# Patient Record
Sex: FEMALE | Race: AMERICAN INDIAN OR ALASKA NATIVE
[De-identification: names, ages, dates, MRNs, and addresses within clinical notes are randomized per-mention and may not be internally consistent; named-entity substitution may affect disease eponyms.]

---

## 2017-12-20 ENCOUNTER — HOSPITAL ENCOUNTER (EMERGENCY)
Dept: HOSPITAL 5 - ED | Age: 32
Discharge: HOME | End: 2017-12-20
Payer: MEDICAID

## 2017-12-20 VITALS — SYSTOLIC BLOOD PRESSURE: 124 MMHG | DIASTOLIC BLOOD PRESSURE: 85 MMHG

## 2017-12-20 DIAGNOSIS — J45.21: Primary | ICD-10-CM

## 2017-12-20 PROCEDURE — 94640 AIRWAY INHALATION TREATMENT: CPT

## 2017-12-20 PROCEDURE — 93005 ELECTROCARDIOGRAM TRACING: CPT

## 2017-12-20 PROCEDURE — 99283 EMERGENCY DEPT VISIT LOW MDM: CPT

## 2017-12-20 PROCEDURE — 96372 THER/PROPH/DIAG INJ SC/IM: CPT

## 2017-12-20 PROCEDURE — 93010 ELECTROCARDIOGRAM REPORT: CPT

## 2017-12-20 NOTE — EMERGENCY DEPARTMENT REPORT
HPI





- General


Chief Complaint: Adult Asthma


Time Seen by Provider: 17 07:47





- HPI


HPI: 





She is a 32-year-old female with a history of asthma who presents to ED with an 

asthma exacerbation that started early this morning.  Patient states she was 

sleeping when the asthma attack occured.  Patient States She Been Coughing to 

This Prior Intermittently, Dry, Nonproductive Cough.


Patient denies fevers/chills/nausea/vomiting/abdominal pain since chest pain.





ED Past Medical Hx





- Past Medical History


Previous Medical History?: Yes


Hx Asthma: Yes





- Surgical History


Past Surgical History?: Yes


Additional Surgical History:  x1





- Social History


Smoking Status: Never Smoker


Substance Use Type: None





- Medications


Home Medications: 


 Home Medications











 Medication  Instructions  Recorded  Confirmed  Last Taken  Type


 


Acetamin/Codeine 120-12Mg/5 ml 5 ml PO TID PRN #60 ml 17  Unknown Rx





[Tylenol/Codeine]     


 


Fluticasone/Salmeterol [Advair 2 puff PO PRN #1 blst.w.dev 17  Unknown Rx





250-50 Diskus]     


 


predniSONE [Deltasone] 20 mg PO QDAY #5 tab 17  Unknown Rx














ED Review of Systems


ROS: 


Stated complaint: SOB


Other details as noted in HPI





Constitutional: denies: chills, fever


Eyes: denies: eye pain, eye discharge, vision change


ENT: denies: ear pain, throat pain


Respiratory: denies: cough, shortness of breath, wheezing


Cardiovascular: denies: chest pain, palpitations


Endocrine: no symptoms reported


Gastrointestinal: denies: abdominal pain, nausea, diarrhea


Genitourinary: denies: urgency, dysuria, hematuria, discharge


Musculoskeletal: denies: back pain, joint swelling, arthralgia


Skin: denies: rash, lesions


Neurological: denies: headache, weakness, paresthesias


Psychiatric: denies: anxiety, depression


Hematological/Lymphatic: denies: easy bleeding, easy bruising





Physical Exam





- Physical Exam


Vital Signs: 


 Vital Signs











  17





  02:47 02:51 03:12


 


Temperature  98.8 F 98.8 F


 


Pulse Rate 122 H 122 H 122 H


 


Pulse Rate [   





Bilateral]   


 


Respiratory  24 24





Rate   


 


Respiratory   





Rate [Bilateral   





]   


 


Blood Pressure 110/78  110/78


 


Blood Pressure  110/78 





[Right]   


 


O2 Sat by Pulse 86 86 86





Oximetry   














  17





  03:29 07:46 07:51


 


Temperature   


 


Pulse Rate   92 H


 


Pulse Rate [ 125 H  





Bilateral]   


 


Respiratory  20 20





Rate   


 


Respiratory 20  





Rate [Bilateral   





]   


 


Blood Pressure   


 


Blood Pressure   124/85





[Right]   


 


O2 Sat by Pulse   95





Oximetry   











Physical Exam: 





GENERAL: Alert and oriented x3, no apparent distress, Normal Gait, atraumatic.


HEAD: Head is normocephalic and a-traumatic.


EYES: Extra ocular muscles are intact. Pupils are equal, round, and reactive to 

light and accommodation.


EARS: symetrical, atraumatic, non tender, ear canal clear and moderate cerumen, 

tympanic membrance non inflamed. gross auditory nml bilaterally. 


NOSE: Nose symetrical, Nontender,Nares appeared normal.


MOUTH:Mouth is well hydrated and without lesions. Tonsils nonerythematous or 

swollen,  Uvula midline, Tongue not elevated. Mucous membranes are moist. 

Posterior pharynx clear, no exudate or lesions. Patent airways.





LUNGS: Symetrical with respiration, No wheezing, no rales or crackles, CTAB.


HEART:  S1, S2 present, regular rate and rhythm without murmur, no rubs, no 

gallops. Non tender to palpation


ABDOMEN: No organomegaly was noted,Positive bowel sounds, soft, and non-

distended. Nontender to palpation on all Quadrants, NO CVA tenderness.


BACK: Full range of motion, no spinal tenderness, nontender to palpation.





EXTREMITIES/MUSCULOSKELETAL: No cyanosis, clubbing, rash, lesions or edema. 

Full ROM bilaterally. UE/LE Pulses 2+ bilaterally.  LE and UE 5+ strength 

bilaterally,


NEUROLOGIC:  The patient is cooperative with no focal neurologic deficits.   

Normal sensation in bilateral upper and lower extremities,  No loss of sensation

, .





SKIN:  Warm and dry, No lesions, No ulceration or induration present.











ED Course


 Vital Signs











  17





  02:47 02:51 03:12


 


Temperature  98.8 F 98.8 F


 


Pulse Rate 122 H 122 H 122 H


 


Pulse Rate [   





Bilateral]   


 


Respiratory  24 24





Rate   


 


Respiratory   





Rate [Bilateral   





]   


 


Blood Pressure 110/78  110/78


 


Blood Pressure  110/78 





[Right]   


 


O2 Sat by Pulse 86 86 86





Oximetry   














  17





  03:29 07:46 07:51


 


Temperature   


 


Pulse Rate   92 H


 


Pulse Rate [ 125 H  





Bilateral]   


 


Respiratory  20 20





Rate   


 


Respiratory 20  





Rate [Bilateral   





]   


 


Blood Pressure   


 


Blood Pressure   124/85





[Right]   


 


O2 Sat by Pulse   95





Oximetry   














ED Medical Decision Making





- Medical Decision Making


32-year-old female presents with asthma exacerbation


ED course: She received 2 breathing treatments with Solu-Medrol IM. 


Patient feeling much after my assessment.


She reports better.  Patient is in no respiratory acute distress


I discussed the patient if worsened symptoms to return to ED.


Patient states she has a nebulizer albuterol treatment and machine at home that 

can be used as needed.


I discussed the patient to follow up with primary care physician.


Patient will be sent home on Advair inhaler, prednisone and Tylenol with 

codeine.


Patient is satting 95% on room air oxygen much better than when she came in the 

ED.


Critical care attestation.: 


If time is entered above; I have spent that time in minutes in the direct care 

of this critically ill patient, excluding procedure time.








ED Disposition


Clinical Impression: 


Asthma exacerbation


Qualifiers:


 Asthma severity: mild Asthma persistence: intermittent Qualified Code(s): 

J45.21 - Mild intermittent asthma with (acute) exacerbation





Disposition: DC-01 TO HOME OR SELFCARE


Is pt being admited?: No


Does the pt Need Aspirin: No


Condition: Stable


Instructions:  Asthma (ED)


Additional Instructions: 


Make sure to follow up with the primary care physician as discussed.


Take all your medications as you've been prescribed.


If you have any worsening symptoms or develop new symptoms please return to ED 

immediately.


Prescriptions: 


Acetamin/Codeine 120-12Mg/5 ml [Tylenol/Codeine] 5 ml PO TID PRN #60 ml


 PRN Reason: Pain


Fluticasone/Salmeterol [Advair 250-50 Diskus] 2 puff PO PRN #1 blst.w.dev


predniSONE [Deltasone] 20 mg PO QDAY #5 tab


Referrals: 


[facility] [Outside] - 3-5 Days


[facility] [Outside] - 3-5 Days


[facility] [place] [Outside] - 3-5 Days


Forms:  Accompanied Note, Work/School Release Form(ED)


Time of Disposition: 08:09

## 2018-02-14 ENCOUNTER — HOSPITAL ENCOUNTER (EMERGENCY)
Dept: HOSPITAL 5 - ED | Age: 33
Discharge: HOME | End: 2018-02-14
Payer: MEDICAID

## 2018-02-14 VITALS — DIASTOLIC BLOOD PRESSURE: 89 MMHG | SYSTOLIC BLOOD PRESSURE: 126 MMHG

## 2018-02-14 DIAGNOSIS — J45.909: ICD-10-CM

## 2018-02-14 DIAGNOSIS — J06.9: Primary | ICD-10-CM

## 2018-02-14 LAB
BASOPHILS # (AUTO): 0 K/MM3 (ref 0–0.1)
BASOPHILS NFR BLD AUTO: 0.4 % (ref 0–1.8)
BUN SERPL-MCNC: 8 MG/DL (ref 7–17)
BUN/CREAT SERPL: 16 %
CALCIUM SERPL-MCNC: 8.7 MG/DL (ref 8.4–10.2)
EOSINOPHIL # BLD AUTO: 0.2 K/MM3 (ref 0–0.4)
EOSINOPHIL NFR BLD AUTO: 5.6 % (ref 0–4.3)
HCT VFR BLD CALC: 34.5 % (ref 30.3–42.9)
HEMOLYSIS INDEX: 3
HGB BLD-MCNC: 10.8 GM/DL (ref 10.1–14.3)
LYMPHOCYTES # BLD AUTO: 1.1 K/MM3 (ref 1.2–5.4)
LYMPHOCYTES NFR BLD AUTO: 28.7 % (ref 13.4–35)
MCH RBC QN AUTO: 24 PG (ref 28–32)
MCHC RBC AUTO-ENTMCNC: 31 % (ref 30–34)
MCV RBC AUTO: 77 FL (ref 79–97)
MONOCYTES # (AUTO): 0.3 K/MM3 (ref 0–0.8)
MONOCYTES % (AUTO): 6.7 % (ref 0–7.3)
PLATELET # BLD: 318 K/MM3 (ref 140–440)
RBC # BLD AUTO: 4.51 M/MM3 (ref 3.65–5.03)

## 2018-02-14 PROCEDURE — 36415 COLL VENOUS BLD VENIPUNCTURE: CPT

## 2018-02-14 PROCEDURE — 85025 COMPLETE CBC W/AUTO DIFF WBC: CPT

## 2018-02-14 PROCEDURE — 84484 ASSAY OF TROPONIN QUANT: CPT

## 2018-02-14 PROCEDURE — 71046 X-RAY EXAM CHEST 2 VIEWS: CPT

## 2018-02-14 PROCEDURE — 80048 BASIC METABOLIC PNL TOTAL CA: CPT

## 2018-02-14 PROCEDURE — 84703 CHORIONIC GONADOTROPIN ASSAY: CPT

## 2018-02-14 NOTE — XRAY REPORT
CHEST 2 VIEWS



INDICATION: Cough.



COMPARISON: None similar at this institution.



FINDINGS: PA and lateral chest radiographs demonstrate normal 

cardiomediastinal silhouette. Clear lungs. Mild cervical spine 

degenerative changes possible.



CONCLUSION: No acute disease in the chest.



Thank you for the opportunity to participate in this patient's care.

## 2018-02-14 NOTE — EMERGENCY DEPARTMENT REPORT
Minor Respiratory





- HPI


Chief Complaint: Chest Pain


Stated Complaint: CHEST PAIN


Time Seen by Provider: 18 10:47


Duration: 2 Days


Minor Respiratory: Yes Cough


Other History: Patient is a 32-year-old female presenting with chest 

discomfort.  Patient states she initially has started coughing yesterday which 

is mild however she progressed to be productive of some clear sputum.  Patient 

states that she has some achy sharp pain on waking in the left chest this is 

associated with cough.  Patient denies any fevers and sore throat headache at 

this time.





ED Review of Systems


ROS: 


Stated complaint: CHEST PAIN


Other details as noted in HPI





Comment: All other systems reviewed and negative





ED Past Medical Hx





- Past Medical History


Hx Asthma: Yes





- Surgical History


Additional Surgical History:  x1





- Social History


Smoking Status: Never Smoker


Substance Use Type: None





- Medications


Home Medications: 


 Home Medications











 Medication  Instructions  Recorded  Confirmed  Last Taken  Type


 


Acetamin/Codeine 120-12Mg/5 ml 5 ml PO TID PRN #60 ml 17  Unknown Rx





[Tylenol/Codeine]     


 


Fluticasone/Salmeterol [Advair 2 puff PO PRN #1 blst.w.dev 17  Unknown Rx





250-50 Diskus]     


 


predniSONE [Deltasone] 20 mg PO QDAY #5 tab 17  Unknown Rx


 


ALBUTEROL Inhaler [ProAir HFA 2 puff IH QID PRN #1 inhalation 18  Unknown 

Rx





Inhaler]     


 


Benzonatate [Tessalon Perle] 100 mg PO Q8HR #12 capsule 18  Unknown Rx


 


predniSONE [Deltasone] 20 mg PO QDAY #5 tab 18  Unknown Rx


 


traMADol [Ultram] 50 mg PO Q6HR PRN #12 tablet 18  Unknown Rx














Minor Respiratory Exam





- Exam


General: 


Vital signs noted. No distress. Alert and acting appropriately.





HEENT: Yes Moist Mucous Membranes, No Pharyngeal Erythema, No Pharyngeal 

Exudates, No Rhinorrhea, No Conjuctival Injection, No Frontal Tenderness, No 

Maxillary Tenderness


Ear: Neither TM Bulge, Neither TM Erythema, Neither EAC Pain, Neither EAC 

Discharge


Neck: Yes Supple, No Adenopathy


Lungs: Yes Good Air Exchange, No Wheezes, No Ronchi, No Stridor, No Cough, No 

Labored Respirations, No Retractions, No Use of Accessory Muscles, No Other 

Abnormal Lung Sounds


Heart: Yes Regular, No Murmur


Abdomen: Yes Normal Bowel Sounds, No Tenderness, No Peritoneal Signs


Skin: No Rash, No Edema


Neurologic: 


Alert and oriented, no deficits.








Musculoskeletal: 


Unremarkable.











ED Course


 Vital Signs











  18





  05:41


 


Temperature 98.3 F


 


Pulse Rate 81


 


Respiratory 17





Rate 


 


Blood Pressure 132/84


 


O2 Sat by Pulse 96





Oximetry 














ED Medical Decision Making





- Lab Data


Result diagrams: 


 18 06:00





 18 06:00





- EKG Data


-: EKG Interpreted by Me


EKG shows normal: sinus rhythm, axis, intervals, QRS complexes, ST-T waves





- Radiology Data


Radiology results: report reviewed





wnl





Critical care attestation.: 


If time is entered above; I have spent that time in minutes in the direct care 

of this critically ill patient, excluding procedure time.








ED Disposition


Clinical Impression: 


Upper respiratory infection


Qualifiers:


 URI type: unspecified URI Qualified Code(s): J06.9 - Acute upper respiratory 

infection, unspecified





Disposition: - TO HOME OR SELFCARE


Is pt being admited?: No


Does the pt Need Aspirin: No


Condition: Stable


Instructions:  Acute Bronchitis (ED)


Prescriptions: 


ALBUTEROL Inhaler [ProAir HFA Inhaler] 2 puff IH QID PRN #1 inhalation


 PRN Reason: Shortness Of Breath


Benzonatate [Tessalon Perle] 100 mg PO Q8HR #12 capsule


predniSONE [Deltasone] 20 mg PO QDAY #5 tab


traMADol [Ultram] 50 mg PO Q6HR PRN #12 tablet


 PRN Reason: Pain


Referrals: 


EIRCKSON GANN MD [Primary Care Provider] - 3-5 Days

## 2018-02-14 NOTE — EMERGENCY DEPARTMENT REPORT
Blank Doc





- Documentation


Documentation: 





Patient is a 32-year-old female presenting with chest discomfort.  Patient 

states she initially has started coughing yesterday which is mild however she 

progressed to be productive of some clear sputum.  Patient states that she has 

some achy sharp pain on waking in the left chest this is associated with cough.

  Patient denies any fevers and sore throat headache at this time.  Patient has 

already had a negative troponin negative.  EKG per nursing protocols.  X-ray 

will be added and patient will be followed with an MLP.